# Patient Record
Sex: FEMALE | URBAN - METROPOLITAN AREA
[De-identification: names, ages, dates, MRNs, and addresses within clinical notes are randomized per-mention and may not be internally consistent; named-entity substitution may affect disease eponyms.]

---

## 2019-05-03 ENCOUNTER — IMPORTED ENCOUNTER (OUTPATIENT)
Dept: URBAN - METROPOLITAN AREA CLINIC 1 | Facility: CLINIC | Age: 39
End: 2019-05-03

## 2019-05-03 PROBLEM — G45.9: Noted: 2019-05-03

## 2019-05-03 PROBLEM — R73.09: Noted: 2019-05-03

## 2019-05-03 PROCEDURE — 92004 COMPRE OPH EXAM NEW PT 1/>: CPT

## 2019-05-03 PROCEDURE — 92083 EXTENDED VISUAL FIELD XM: CPT

## 2019-05-03 NOTE — PATIENT DISCUSSION
1.  DM Type II (Diet Controlled) without sign of diabetic retinopathy and no blot heme on dilated retinal examination today OU No Macular Edema. Discussed the pathophysiology of diabetes and its effect on the eye and risk of blindness. Stressed the importance of strong glucose control. Advised of importance of at least yearly dilated examinations but to contact us immediately for any problems or concerns. 2. Transient Ischemic Attack likely secondary to Hypotensive event. Patient reports when hospitalized on April 11 blood pressure has gotten as low as 85/35. Patient had previously been on Lisinopril and has since then d/c Lisinopril. Patient states blurred vision lasted about 3 days but central vision was never lost. HVF today likely possible superior arc vs. artifact. Reduced test reliability due to high false negatives. Will have patient return in three months for further w/u and repeat testing. Patient defers Glasses MRx today. Letter sent to Neurologist Dr. Chip Farmer. Return for an appointment in 3 months for a 10 HVF with Dr. Rosemary Webb

## 2019-05-03 NOTE — PATIENT DISCUSSION
Transient Ischemic Attack likely secondary to Hypotensive event. Patient reports Blood pressure was 85/35. Patient has d/c BP med since then. DM Type II Diet controlled.

## 2022-04-02 ASSESSMENT — VISUAL ACUITY
OD_CC: 20/20
OS_CC: 20/20

## 2022-04-02 ASSESSMENT — TONOMETRY
OD_IOP_MMHG: 15
OS_IOP_MMHG: 15